# Patient Record
Sex: FEMALE | URBAN - NONMETROPOLITAN AREA
[De-identification: names, ages, dates, MRNs, and addresses within clinical notes are randomized per-mention and may not be internally consistent; named-entity substitution may affect disease eponyms.]

---
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
## 2024-02-14 ENCOUNTER — AMBULATORY SURGICAL CENTER (OUTPATIENT)
Dept: URBAN - METROPOLITAN AREA SURGERY 7 | Facility: SURGERY | Age: 19
End: 2024-02-14

## 2024-02-14 ENCOUNTER — OFFICE (OUTPATIENT)
Dept: URBAN - METROPOLITAN AREA PATHOLOGY 1 | Facility: PATHOLOGY | Age: 19
End: 2024-02-14
Payer: COMMERCIAL

## 2024-02-14 VITALS
SYSTOLIC BLOOD PRESSURE: 112 MMHG | DIASTOLIC BLOOD PRESSURE: 62 MMHG | TEMPERATURE: 97.9 F | OXYGEN SATURATION: 93 % | HEART RATE: 90 BPM | OXYGEN SATURATION: 90 % | HEART RATE: 61 BPM | OXYGEN SATURATION: 95 % | OXYGEN SATURATION: 90 % | SYSTOLIC BLOOD PRESSURE: 98 MMHG | OXYGEN SATURATION: 99 % | SYSTOLIC BLOOD PRESSURE: 110 MMHG | HEART RATE: 89 BPM | HEART RATE: 109 BPM | HEART RATE: 110 BPM | RESPIRATION RATE: 16 BRPM | OXYGEN SATURATION: 93 % | HEART RATE: 110 BPM | DIASTOLIC BLOOD PRESSURE: 62 MMHG | RESPIRATION RATE: 9 BRPM | DIASTOLIC BLOOD PRESSURE: 65 MMHG | DIASTOLIC BLOOD PRESSURE: 73 MMHG | OXYGEN SATURATION: 94 % | RESPIRATION RATE: 16 BRPM | HEART RATE: 67 BPM | HEART RATE: 60 BPM | SYSTOLIC BLOOD PRESSURE: 103 MMHG | WEIGHT: 145 LBS | SYSTOLIC BLOOD PRESSURE: 103 MMHG | HEART RATE: 67 BPM | SYSTOLIC BLOOD PRESSURE: 110 MMHG | OXYGEN SATURATION: 99 % | SYSTOLIC BLOOD PRESSURE: 108 MMHG | RESPIRATION RATE: 18 BRPM | DIASTOLIC BLOOD PRESSURE: 59 MMHG | HEART RATE: 90 BPM | RESPIRATION RATE: 18 BRPM | SYSTOLIC BLOOD PRESSURE: 98 MMHG | SYSTOLIC BLOOD PRESSURE: 112 MMHG | HEART RATE: 61 BPM | DIASTOLIC BLOOD PRESSURE: 65 MMHG | WEIGHT: 145 LBS | SYSTOLIC BLOOD PRESSURE: 113 MMHG | HEIGHT: 66 IN | DIASTOLIC BLOOD PRESSURE: 57 MMHG | HEART RATE: 109 BPM | HEART RATE: 60 BPM | DIASTOLIC BLOOD PRESSURE: 59 MMHG | HEIGHT: 66 IN | TEMPERATURE: 97.9 F | DIASTOLIC BLOOD PRESSURE: 57 MMHG | RESPIRATION RATE: 9 BRPM | OXYGEN SATURATION: 94 % | DIASTOLIC BLOOD PRESSURE: 73 MMHG | SYSTOLIC BLOOD PRESSURE: 108 MMHG | OXYGEN SATURATION: 95 % | HEART RATE: 89 BPM | SYSTOLIC BLOOD PRESSURE: 113 MMHG

## 2024-02-14 DIAGNOSIS — R11.2 NAUSEA WITH VOMITING, UNSPECIFIED: ICD-10-CM

## 2024-02-14 DIAGNOSIS — K29.30 CHRONIC SUPERFICIAL GASTRITIS WITHOUT BLEEDING: ICD-10-CM

## 2024-02-14 DIAGNOSIS — K22.9 DISEASE OF ESOPHAGUS, UNSPECIFIED: ICD-10-CM

## 2024-02-14 PROCEDURE — 43239 EGD BIOPSY SINGLE/MULTIPLE: CPT | Performed by: INTERNAL MEDICINE

## 2024-02-14 PROCEDURE — 88342 IMHCHEM/IMCYTCHM 1ST ANTB: CPT | Performed by: PATHOLOGY

## 2024-02-14 PROCEDURE — 88305 TISSUE EXAM BY PATHOLOGIST: CPT | Performed by: PATHOLOGY

## 2024-02-16 LAB
PDF: PDF REPORT: (no result)
PDF: PDF REPORT: (no result)

## 2024-04-18 ENCOUNTER — OFFICE (OUTPATIENT)
Dept: URBAN - METROPOLITAN AREA CLINIC 18 | Facility: CLINIC | Age: 19
End: 2024-04-18

## 2024-04-18 VITALS
HEIGHT: 66 IN | WEIGHT: 150 LBS | DIASTOLIC BLOOD PRESSURE: 78 MMHG | HEART RATE: 69 BPM | OXYGEN SATURATION: 98 % | SYSTOLIC BLOOD PRESSURE: 120 MMHG

## 2024-04-18 DIAGNOSIS — R10.13 EPIGASTRIC PAIN: ICD-10-CM

## 2024-04-18 DIAGNOSIS — R11.2 NAUSEA WITH VOMITING, UNSPECIFIED: ICD-10-CM

## 2024-04-18 PROCEDURE — 99214 OFFICE O/P EST MOD 30 MIN: CPT

## 2024-04-18 RX ORDER — DICYCLOMINE HYDROCHLORIDE 20 MG/1
60 TABLET ORAL
Qty: 90 | Refills: 0 | Status: ACTIVE
Start: 2024-04-18

## 2024-04-18 RX ORDER — PANTOPRAZOLE SODIUM 40 MG/1
40 TABLET, DELAYED RELEASE ORAL
Qty: 60 | Refills: 0 | Status: ACTIVE
Start: 2024-04-18

## 2024-04-19 PROBLEM — K22.9 DISEASE OF ESOPHAGUS, UNSPECIFIED: Status: ACTIVE | Noted: 2024-02-14

## 2024-06-19 ENCOUNTER — OFFICE (OUTPATIENT)
Dept: URBAN - METROPOLITAN AREA CLINIC 18 | Facility: CLINIC | Age: 19
End: 2024-06-19

## 2025-02-26 NOTE — PROCEDURE: MIPS QUALITY
Orders:    BD DEXA SCAN AXIAL SKELETON; Future     Quality 394c: Hpv Vaccine For Adolescents: Patient has had at least three HPV vaccines on or between the patient's 9th and 13th birthdays.